# Patient Record
Sex: FEMALE | HISPANIC OR LATINO | URBAN - METROPOLITAN AREA
[De-identification: names, ages, dates, MRNs, and addresses within clinical notes are randomized per-mention and may not be internally consistent; named-entity substitution may affect disease eponyms.]

---

## 2024-01-16 ENCOUNTER — OFFICE VISIT (OUTPATIENT)
Dept: SURGERY | Facility: CLINIC | Age: 54
End: 2024-01-16
Payer: COMMERCIAL

## 2024-01-16 ENCOUNTER — HOSPITAL ENCOUNTER (OUTPATIENT)
Dept: RADIOLOGY | Facility: EXTERNAL LOCATION | Age: 54
Discharge: HOME | End: 2024-01-16

## 2024-01-16 DIAGNOSIS — R11.2 NAUSEA AND VOMITING, UNSPECIFIED VOMITING TYPE: ICD-10-CM

## 2024-01-16 DIAGNOSIS — R10.11 RIGHT UPPER QUADRANT PAIN: ICD-10-CM

## 2024-01-16 DIAGNOSIS — R14.0 BLOATING: Primary | ICD-10-CM

## 2024-01-16 DIAGNOSIS — R19.4 BOWEL HABIT CHANGES: ICD-10-CM

## 2024-01-16 PROCEDURE — 99203 OFFICE O/P NEW LOW 30 MIN: CPT | Performed by: PHYSICIAN ASSISTANT

## 2024-01-16 RX ORDER — METOPROLOL SUCCINATE 50 MG/1
50 TABLET, EXTENDED RELEASE ORAL
COMMUNITY
Start: 2020-02-17

## 2024-01-16 RX ORDER — METOCLOPRAMIDE 5 MG/1
TABLET ORAL
COMMUNITY
Start: 2021-02-09

## 2024-01-16 RX ORDER — CYCLOBENZAPRINE HCL 10 MG
10 TABLET ORAL 2 TIMES DAILY PRN
COMMUNITY
Start: 2021-12-21

## 2024-01-16 RX ORDER — GLYBURIDE 5 MG/1
5 TABLET ORAL
COMMUNITY
Start: 2023-11-06

## 2024-01-16 RX ORDER — LISINOPRIL 20 MG/1
TABLET ORAL
COMMUNITY
Start: 2019-10-01

## 2024-01-16 RX ORDER — OMEPRAZOLE 20 MG/1
20 CAPSULE, DELAYED RELEASE ORAL
COMMUNITY
Start: 2023-02-15

## 2024-01-16 RX ORDER — IBUPROFEN 200 MG
CAPSULE ORAL
COMMUNITY
Start: 2023-06-20

## 2024-01-16 RX ORDER — LEVOTHYROXINE SODIUM 175 UG/1
175 TABLET ORAL
COMMUNITY
Start: 2023-12-27

## 2024-01-16 RX ORDER — ALBUTEROL SULFATE 90 UG/1
2 AEROSOL, METERED RESPIRATORY (INHALATION) EVERY 4 HOURS PRN
COMMUNITY
Start: 2023-11-02

## 2024-01-16 RX ORDER — NAPROXEN SODIUM 220 MG/1
81 TABLET, FILM COATED ORAL
COMMUNITY
Start: 2023-12-27

## 2024-01-16 RX ORDER — ATORVASTATIN CALCIUM 40 MG/1
1 TABLET, FILM COATED ORAL
COMMUNITY
Start: 2017-04-10

## 2024-01-16 RX ORDER — NORETHINDRONE ACETATE AND ETHINYL ESTRADIOL, ETHINYL ESTRADIOL AND FERROUS FUMARATE 1MG-10(24)
1 KIT ORAL
COMMUNITY
Start: 2017-04-10

## 2024-01-16 RX ORDER — LORAZEPAM 1 MG/1
1 TABLET ORAL EVERY 6 HOURS PRN
Qty: 2 TABLET | Refills: 0 | Status: SHIPPED | OUTPATIENT
Start: 2024-01-16 | End: 2024-01-17

## 2024-01-16 RX ORDER — METFORMIN HYDROCHLORIDE 1000 MG/1
1 TABLET ORAL
COMMUNITY
Start: 2019-05-10

## 2024-01-16 RX ORDER — LISINOPRIL AND HYDROCHLOROTHIAZIDE 10; 12.5 MG/1; MG/1
1 TABLET ORAL
COMMUNITY
Start: 2017-04-10

## 2024-01-16 RX ORDER — LANCETS 28 GAUGE
EACH MISCELLANEOUS
COMMUNITY
Start: 2023-06-20

## 2024-01-16 NOTE — PROGRESS NOTES
Subjective   Patient ID: Gayle Angel is a 53 y.o. female who presents for New Patient Visit (Stomach bloating and pain on the right side).    HPI  53-year-old female who 4 years ago who took out her gallbladder due to gallbladder disease.  Since then every few months she gets severe abdominal bloating feels like food is getting stuck and then she vomits issues and goes on for a week it then goes away and will come back within a month or so.  She has had bowel habit changes in the meantime.  She has had very thin stools sometimes with constipation.  She did have a colonoscopy about 15 years ago she does not recall any of the results.        Review of Systems  Negative other than mentioned in HPI    ENT: No earache, no sore throat, no nosebleeds  Cardiovascular: No chest pain, no shortness of breath, no leg pain, no edema  Respiratory: No shortness of breath on exertion, no wheezing  Gastrointestinal: abdominal pain,  nausea, vomiting and/or diarrhea  Musculoskeletal: No pain moving all extremities, no back pain ambulating normally  Skin: No rashes, no lesions, and no skin changes  Neuro: No headache, no confusion, no numbness and tingling  Psychiatric, normal mood, not suicidal, not homicidal, feeling good          Physical Exam  Eyes: Conjunctiva non -icteric and eye lids are without obvious rash or drooping. Pupils are symmetric.   Ears, Nose, Mouth, and Throat: External ears and nose appear to be without deformity or rash. No lesions or masses noted. Hearing is grossly intact.   Neck:. No JVD noted, tracheal position is midline. No thyromegaly, no thyroid nodules  Head and Face: Examination of the head and face revealed no abnormalities.   Respiratory: No gasping or shortness of breath noted, no use of accessory muscles noted. Clear to auscultate bilaterally  Cardiovascular: Examination for edema is normal. Regular rate and rhythm S1 S2 without murmurs  GI: Abdomen  tender to palpation RUQ with bloating ,  bowel sounds present no hepatosplenomegaly she is bloated  Skin: No rashes or open lesions/ulcers identified on skin.   Musk: Digits/nails show no clubbing or cyanosis. No asymmetry or masses noted of the musculature. Examination of the muscles/joints/bones show normal range of motion. Gait is grossly normally.   Neurologic: Cranial nerves II- XII intact, motor strength 5/5 muscle strength of the lower extremities bilaterally and equal.      Objective     No diagnosis found.   There is no problem list on file for this patient.     Allergies   Allergen Reactions    Spironolactone Itching    Glipizide Unknown and Other     hypoglycemia    Methimazole Hives    Oxycodone-Acetaminophen Itching    Penicillins Hives      Ativan 1 mg 1 mg 2 tablets 1 take 1 hour prior to MRI      Past Medical History:   Diagnosis Date    Personal history of other diseases of the circulatory system 02/09/2021    History of hypertension    Personal history of other endocrine, nutritional and metabolic disease 02/09/2021    History of diabetes mellitus     Social History     Tobacco Use   Smoking Status Never   Smokeless Tobacco Never     No family history on file.   Past Surgical History:   Procedure Laterality Date    OTHER SURGICAL HISTORY  02/09/2021    Knee replacement    OTHER SURGICAL HISTORY  02/09/2021    Cardiac cath His ablation    OTHER SURGICAL HISTORY  05/17/2021    Cholecystectomy laparoscopic    OTHER SURGICAL HISTORY  03/08/2021    Esophagogastroduodenoscopy       Assessment/Plan   Today we discussed her symptoms.  I like to get an MRI of the abdomen to assess intra-abdominal abnormalities.  She also will require an EGD and colonoscopy.        Today we had a discussion about EGD.  Patient was informed this is done as an outpatient surgery.  It takes around 30 minutes. It is done under monitored anesthesia care.  Alternatives to procedure were discussed.  All questions answered risk and benefits were discussed.  Patient had  complete understanding.  Patient would like to proceed.    Today we had a discussion about colonoscopy.  Patient was informed this is done as an outpatient surgery.  It takes around 30 minutes.  Patient will require a colon prep.  It is done under monitored anesthesia care.  Alternatives to procedure were discussed.  All questions answered risk and benefits were discussed.  Patient had complete understanding.  Patient would like to proceed.     Encounter Diagnoses   Name Primary?    Right upper quadrant pain     Bloating Yes    Nausea and vomiting, unspecified vomiting type     Bowel habit changes        I have reviewed all data including labs,radiologic and previous reports.        **Portions of this medical record have been created using voice recognition software and may have minor errors which are inherent in voice recognition systems. It has not been fully edited for typographical or grammatical errors**

## 2024-01-18 DIAGNOSIS — R10.9 ABDOMINAL PAIN, UNSPECIFIED ABDOMINAL LOCATION: ICD-10-CM

## 2024-01-18 DIAGNOSIS — R14.0 BLOATING: ICD-10-CM

## 2024-01-24 LAB
NON-UH HIE BUN: 17 MG/DL (ref 9–23)
NON-UH HIE CREATININE: 1.1 MG/DL (ref 0.5–0.8)
NON-UH HIE GFR AA: >60
NON-UH HIE GLOMERULAR FILTRATION RATE: 52 ML/MIN/1.73M?

## 2024-01-29 LAB — NON-UH HIE POC GLUCOSE: 252 MG/DL (ref 72–100)

## 2024-02-12 ENCOUNTER — OFFICE VISIT (OUTPATIENT)
Dept: SURGERY | Facility: CLINIC | Age: 54
End: 2024-02-12
Payer: COMMERCIAL

## 2024-02-12 DIAGNOSIS — K29.50 OTHER CHRONIC GASTRITIS WITHOUT HEMORRHAGE: Primary | ICD-10-CM

## 2024-02-12 DIAGNOSIS — K44.9 HIATAL HERNIA: ICD-10-CM

## 2024-02-12 DIAGNOSIS — K57.90 DIVERTICULOSIS: ICD-10-CM

## 2024-02-12 DIAGNOSIS — K22.4 ESOPHAGEAL SPASM: ICD-10-CM

## 2024-02-12 PROCEDURE — 99214 OFFICE O/P EST MOD 30 MIN: CPT | Performed by: PHYSICIAN ASSISTANT

## 2024-02-12 NOTE — PROGRESS NOTES
Subjective   Patient ID: Gayle Angel is a 53 y.o. female who presents for Follow-up (EGD and COLONOSCOPY fuv done on 01/29/24).    HPI  53-year-old female with a history of laparoscopic cholecystectomy previously continues to have abdominal pain bloating and reflux.  She underwent an EGD and a colonoscopy recently she is here to discuss results.  She also had an MRCP this has not been read yet we are trying to get radiology to read that today.  She states she is got nauseous twice.  Since her EGD she is taking the Pepcid 40 mg nightly.  She has seen some improvement.  She still complains some right upper quadrant pain and right mid quadrant pain.  She is here to discuss her EGD and colonoscopy results and the next steps.      Review of Systems  Negative other than mentioned in HPI    ENT: No earache, no sore throat, no nosebleeds  Cardiovascular: No chest pain, no shortness of breath, no leg pain, no edema  Respiratory: No shortness of breath on exertion, no wheezing  Gastrointestinal: No abdominal pain, no melena, no nausea, vomiting and/or diarrhea  Musculoskeletal: No pain moving all extremities, no back pain ambulating normally  Skin: No rashes, no lesions, and no skin changes  Neuro: No headache, no confusion, no numbness and tingling  Psychiatric, normal mood, not suicidal, not homicidal, feeling good        Physical Exam  Eyes: Conjunctiva non -icteric and eye lids are without obvious rash or drooping. Pupils are symmetric.   Ears, Nose, Mouth, and Throat: External ears and nose appear to be without deformity or rash. No lesions or masses noted. Hearing is grossly intact.   Neck:. No JVD noted, tracheal position is midline. No thyromegaly, no thyroid nodules  Head and Face: Examination of the head and face revealed no abnormalities.   Respiratory: No gasping or shortness of breath noted, no use of accessory muscles noted.  Cardiovascular: Examination for edema is normal.   GI: Abdomen non tender to  palpation, bowel sounds present no hepatosplenomegaly  Skin: No rashes or open lesions/ulcers identified on skin.   Musk: Digits/nails show no clubbing or cyanosis. No asymmetry or masses noted of the musculature. Examination of the muscles/joints/bones show normal range of motion. Gait is grossly normally.   Neurologic: Cranial nerves II- XII intact, motor strength 5/5 muscle strength of the lower extremities bilaterally and equal.      Objective     No diagnosis found.   There is no problem list on file for this patient.     Allergies   Allergen Reactions    Spironolactone Itching    Glipizide Unknown and Other     hypoglycemia    Methimazole Hives    Oxycodone-Acetaminophen Itching    Penicillins Hives      Medication Documentation Review Audit       Reviewed by Nasima Gillis MA (Medical Assistant) on 02/12/24 at 0757      Medication Order Taking? Sig Documenting Provider Last Dose Status   albuterol 90 mcg/actuation inhaler [id number] No Inhale 2 puffs every 4 hours if needed. Historical Provider, MD Taking Active   aspirin 81 mg chewable tablet [id number] No Use 1 tablet (81 mg) in the mouth or throat once daily. Historical Provider, MD Taking Active   atorvastatin (Lipitor) 40 mg tablet [id number] No 1 mL. Historical Provider, MD Taking Active   Blood Glucose Test strip [id number] No Freestyle glucometer. Patient to test 2 daily as directed.Dx code: E11.9 Patient is not insulin dependent. Historical Provider, MD Taking Active   cyclobenzaprine (Flexeril) 10 mg tablet [id number] No Take 1 tablet (10 mg) by mouth 2 times a day as needed. Historical Provider, MD Taking Active   dulaglutide (Trulicity) 1.5 mg/0.5 mL pen injector injection [id number] No Inject 1.5 mg under the skin. Historical Provider, MD Taking Active   FreeStyle Lancets 28 gauge [id number] No TEST TWICE A DAY AS DIRECTED Historical Provider, MD Taking Active   glyBURIDE (Diabeta) 5 mg tablet [id number] No Take 1 tablet (5 mg) by mouth once daily.  Va Vidal MD Taking Active   levothyroxine (Synthroid, Levoxyl) 175 mcg tablet [id number] No Take 1 tablet (175 mcg) by mouth once daily. Va Vidal MD Taking Active   lisinopril 20 mg tablet [id number] No TAKE ONE AND ONE HALF TABLETS BY MOUTH ONCE DAILY Strength: 20 mg Va Vidal MD Taking Active   lisinopriL-hydrochlorothiazide 10-12.5 mg tablet [id number] No 1 mL. Va Vidal MD Taking Active   LORazepam (Ativan) 1 mg tablet [id number]  Take 1 tablet (1 mg) by mouth every 6 hours if needed for anxiety for up to 1 day. Take 1 hour before MRI Marva Augustine PA-C   24 2359   metFORMIN (Glucophage) 1,000 mg tablet [id number] No Take 1 tablet (1,000 mg) by mouth 2 times a day with meals. Va Vidal MD Taking Active   metoclopramide (Reglan) 5 mg tablet [id number] No Take by mouth. Va Vidal MD Taking Active   metoprolol succinate XL (Toprol-XL) 50 mg 24 hr tablet [id number] No Take 1 tablet (50 mg) by mouth once daily. Va Vidal MD Taking Active   norethindrone-e.estradioL-iron (Lo Loestrin Fe) 1 mg-10 mcg (24)/10 mcg (2) tablet [id number] No 1 mL. Va Vidal MD Taking Active   omeprazole (PriLOSEC) 20 mg DR capsule [id number] No Take 1 capsule (20 mg) by mouth once daily. Va Vidal MD Taking Active                    Past Medical History:   Diagnosis Date    Personal history of other diseases of the circulatory system 2021    History of hypertension    Personal history of other endocrine, nutritional and metabolic disease 2021    History of diabetes mellitus     Social History     Tobacco Use   Smoking Status Never   Smokeless Tobacco Never     No family history on file.   Past Surgical History:   Procedure Laterality Date    OTHER SURGICAL HISTORY  2021    Knee replacement    OTHER SURGICAL HISTORY  2021    Cardiac cath His ablation    OTHER SURGICAL HISTORY  2021    Cholecystectomy  laparoscopic    OTHER SURGICAL HISTORY  03/08/2021    Esophagogastroduodenoscopy       Assessment/Plan   Today we discussed her EGD results which showed gastritis that was chronic in nature 2 biopsies were done that were showed no evidence of gluten sensitivity or enteropathy gastric and antral mucosa showed con chronic gastritis.  Esophageal dilatation was done at time of EGD and she had a small hiatal hernia everything and there. in her colonoscopy no biopsies were needed she has some diverticulosis they recommended a high-fiber diet stool softeners repeat colonoscopy in 10 years.  As far as the EGD goes patient continues to have symptoms it is recommended that she have a small bowel follow-through to rule out achalasia and a gastric emptying study.  We discussed this to rule out gastroparesis because patient's been a diabetic for more than 3 years.  Patient is to follow-up in 1 to 2 months if she continues to have symptoms and we will order these test.  MRCP has not been read will call patient later with results    Encounter Diagnoses   Name Primary?    Other chronic gastritis without hemorrhage Yes    Hiatal hernia     Esophageal spasm     Diverticulosis      I have reviewed all data including labs,radiologic and previous reports.      **Portions of this medical record have been created using voice recognition software and may have minor errors which are inherent in voice recognition systems. It has not been fully edited for typographical or grammatical errors**

## 2024-02-15 ENCOUNTER — TELEPHONE (OUTPATIENT)
Dept: SURGERY | Facility: CLINIC | Age: 54
End: 2024-02-15
Payer: COMMERCIAL